# Patient Record
Sex: FEMALE | Race: WHITE | Employment: FULL TIME
[De-identification: names, ages, dates, MRNs, and addresses within clinical notes are randomized per-mention and may not be internally consistent; named-entity substitution may affect disease eponyms.]

---

## 2017-01-11 ENCOUNTER — SURGERY (OUTPATIENT)
Age: 42
End: 2017-01-11

## 2017-01-11 ENCOUNTER — ANESTHESIA EVENT (OUTPATIENT)
Dept: LABOR AND DELIVERY | Age: 42
End: 2017-01-11
Payer: COMMERCIAL

## 2017-01-11 PROBLEM — O42.90 PREMATURE RUPTURE OF MEMBRANES: Status: ACTIVE | Noted: 2017-01-11

## 2017-01-11 PROCEDURE — 77030003665 HC NDL SPN BBMI -A: Performed by: ANESTHESIOLOGY

## 2017-01-11 PROCEDURE — 74011000250 HC RX REV CODE- 250

## 2017-01-11 PROCEDURE — 74011250636 HC RX REV CODE- 250/636: Performed by: OBSTETRICS & GYNECOLOGY

## 2017-01-11 PROCEDURE — 77030007880 HC KT SPN EPDRL BBMI -B: Performed by: ANESTHESIOLOGY

## 2017-01-11 PROCEDURE — 74011250636 HC RX REV CODE- 250/636

## 2017-01-11 RX ORDER — OXYTOCIN/RINGER'S LACTATE 30/500 ML
PLASTIC BAG, INJECTION (ML) INTRAVENOUS
Status: DISCONTINUED | OUTPATIENT
Start: 2017-01-11 | End: 2017-01-11 | Stop reason: HOSPADM

## 2017-01-11 RX ORDER — DEXAMETHASONE SODIUM PHOSPHATE 4 MG/ML
INJECTION, SOLUTION INTRA-ARTICULAR; INTRALESIONAL; INTRAMUSCULAR; INTRAVENOUS; SOFT TISSUE AS NEEDED
Status: DISCONTINUED | OUTPATIENT
Start: 2017-01-11 | End: 2017-01-11 | Stop reason: HOSPADM

## 2017-01-11 RX ORDER — PROMETHAZINE HYDROCHLORIDE 25 MG/ML
INJECTION, SOLUTION INTRAMUSCULAR; INTRAVENOUS AS NEEDED
Status: DISCONTINUED | OUTPATIENT
Start: 2017-01-11 | End: 2017-01-11 | Stop reason: HOSPADM

## 2017-01-11 RX ORDER — SODIUM CHLORIDE, SODIUM LACTATE, POTASSIUM CHLORIDE, CALCIUM CHLORIDE 600; 310; 30; 20 MG/100ML; MG/100ML; MG/100ML; MG/100ML
INJECTION, SOLUTION INTRAVENOUS
Status: DISCONTINUED | OUTPATIENT
Start: 2017-01-11 | End: 2017-01-11 | Stop reason: HOSPADM

## 2017-01-11 RX ORDER — MORPHINE SULFATE 0.5 MG/ML
INJECTION, SOLUTION EPIDURAL; INTRATHECAL; INTRAVENOUS AS NEEDED
Status: DISCONTINUED | OUTPATIENT
Start: 2017-01-11 | End: 2017-01-11 | Stop reason: HOSPADM

## 2017-01-11 RX ORDER — BUPIVACAINE HYDROCHLORIDE 7.5 MG/ML
INJECTION, SOLUTION INTRASPINAL AS NEEDED
Status: DISCONTINUED | OUTPATIENT
Start: 2017-01-11 | End: 2017-01-11 | Stop reason: HOSPADM

## 2017-01-11 RX ORDER — ONDANSETRON 2 MG/ML
INJECTION INTRAMUSCULAR; INTRAVENOUS AS NEEDED
Status: DISCONTINUED | OUTPATIENT
Start: 2017-01-11 | End: 2017-01-11 | Stop reason: HOSPADM

## 2017-01-11 RX ORDER — KETOROLAC TROMETHAMINE 30 MG/ML
INJECTION, SOLUTION INTRAMUSCULAR; INTRAVENOUS AS NEEDED
Status: DISCONTINUED | OUTPATIENT
Start: 2017-01-11 | End: 2017-01-11 | Stop reason: HOSPADM

## 2017-01-11 RX ADMIN — CEFAZOLIN 2 G: 1 INJECTION, POWDER, FOR SOLUTION INTRAMUSCULAR; INTRAVENOUS; PARENTERAL at 07:48

## 2017-01-11 RX ADMIN — SODIUM CHLORIDE, SODIUM LACTATE, POTASSIUM CHLORIDE, CALCIUM CHLORIDE: 600; 310; 30; 20 INJECTION, SOLUTION INTRAVENOUS at 07:48

## 2017-01-11 RX ADMIN — PROMETHAZINE HYDROCHLORIDE 6.25 MG: 25 INJECTION, SOLUTION INTRAMUSCULAR; INTRAVENOUS at 08:45

## 2017-01-11 RX ADMIN — Medication 250 ML/HR: at 08:23

## 2017-01-11 RX ADMIN — BUPIVACAINE HYDROCHLORIDE 1.6 ML: 7.5 INJECTION, SOLUTION INTRASPINAL at 08:00

## 2017-01-11 RX ADMIN — SODIUM CHLORIDE, SODIUM LACTATE, POTASSIUM CHLORIDE, CALCIUM CHLORIDE: 600; 310; 30; 20 INJECTION, SOLUTION INTRAVENOUS at 08:21

## 2017-01-11 RX ADMIN — ONDANSETRON 4 MG: 2 INJECTION INTRAMUSCULAR; INTRAVENOUS at 08:29

## 2017-01-11 RX ADMIN — MORPHINE SULFATE 250 MCG: 0.5 INJECTION, SOLUTION EPIDURAL; INTRATHECAL; INTRAVENOUS at 08:00

## 2017-01-11 RX ADMIN — KETOROLAC TROMETHAMINE 30 MG: 30 INJECTION, SOLUTION INTRAMUSCULAR; INTRAVENOUS at 08:56

## 2017-01-11 RX ADMIN — DEXAMETHASONE SODIUM PHOSPHATE 8 MG: 4 INJECTION, SOLUTION INTRA-ARTICULAR; INTRALESIONAL; INTRAMUSCULAR; INTRAVENOUS; SOFT TISSUE at 08:29

## 2017-01-11 NOTE — ANESTHESIA PROCEDURE NOTES
Spinal Block    Performed by: Sheldon Gonzalez  Authorized by: Sheldon Gonzalez     Pre-procedure:   Indications: primary anesthetic  Preanesthetic Checklist: patient identified, risks and benefits discussed, anesthesia consent, patient being monitored and timeout performed    Timeout Time: 07:55          Spinal Block:   Patient Position:  Seated  Prep Region:  Lumbar  Prep: chlorhexidine      Location:  L3-4  Technique:  Single shot  Local:  Lidocaine 1%  Local Dose (mL):  3    Needle:   Needle Type:  Pencan  Needle Gauge:  25 G  Attempts:  1      Events: CSF confirmed, no blood with aspiration and no paresthesia        Assessment:  Insertion:  Uncomplicated  Patient tolerance:  Patient tolerated the procedure well with no immediate complications  All needles out intact, procedure tolerated well without problems

## 2017-01-11 NOTE — ANESTHESIA PREPROCEDURE EVALUATION
Anesthetic History   No history of anesthetic complications            Review of Systems / Medical History  Patient summary reviewed, nursing notes reviewed and pertinent labs reviewed    Pulmonary  Within defined limits                 Neuro/Psych   Within defined limits           Cardiovascular  Within defined limits                Exercise tolerance: >4 METS     GI/Hepatic/Renal     GERD: well controlled           Endo/Other  Within defined limits           Other Findings   Comments: Term preg, hsv2  outbreak, for emerg., cs.          Physical Exam    Airway  Mallampati: II  TM Distance: 4 - 6 cm  Neck ROM: normal range of motion   Mouth opening: Normal     Cardiovascular    Rhythm: regular           Dental  No notable dental hx       Pulmonary                 Abdominal  GI exam deferred       Other Findings            Anesthetic Plan    ASA: 2, emergent  Anesthesia type: spinal      Post-op pain plan if not by surgeon: intrathecal opiates      Anesthetic plan and risks discussed with: Patient and Spouse      duromorph for p/o pain discussed

## 2017-01-12 ENCOUNTER — ANESTHESIA (OUTPATIENT)
Dept: LABOR AND DELIVERY | Age: 42
End: 2017-01-12
Payer: COMMERCIAL

## 2017-01-12 NOTE — ANESTHESIA POSTPROCEDURE EVALUATION
Post-Anesthesia Evaluation and Assessment    Patient: Magnus Hensley MRN: [id number]  SSN: [id number]    YOB: 1975  Age: 46 y.o. Sex: female       Cardiovascular Function/Vital Signs  Visit Vitals    /74 (BP 1 Location: Right arm, BP Patient Position: At rest)    Pulse 89    Temp 36.9 °C (98.5 °F)    Resp 17    Ht 5' 7\" (1.702 m)    Wt 76.2 kg (168 lb)    SpO2 100%    Breastfeeding Yes    BMI 26.31 kg/m2       Patient is status post spinal anesthesia for Procedure(s):   SECTION. Nausea/Vomiting: None    Postoperative hydration reviewed and adequate. Pain:  Pain Scale 1: Numeric (0 - 10) (17)  Pain Intensity 1: 4 (17)   Managed    Neurological Status:   Neuro (WDL): Within Defined Limits (17 1030)   At baseline    Mental Status and Level of Consciousness: Arousable    Pulmonary Status:   O2 Device: Room air (17 1725)   Adequate oxygenation and airway patent    Complications related to anesthesia: None    Post-anesthesia assessment completed. No concerns. Good result with spinal anesthesia, resolving normally.     Signed By: Adin Almeida MD     2017

## 2021-05-17 ENCOUNTER — TRANSCRIBE ORDER (OUTPATIENT)
Dept: SCHEDULING | Age: 46
End: 2021-05-17

## 2021-05-17 DIAGNOSIS — Z12.31 SCREENING MAMMOGRAM FOR HIGH-RISK PATIENT: Primary | ICD-10-CM

## 2021-05-19 ENCOUNTER — HOSPITAL ENCOUNTER (OUTPATIENT)
Dept: MAMMOGRAPHY | Age: 46
Discharge: HOME | End: 2021-05-19
Attending: OBSTETRICS & GYNECOLOGY

## 2021-05-19 DIAGNOSIS — Z12.31 SCREENING MAMMOGRAM FOR HIGH-RISK PATIENT: ICD-10-CM

## 2021-07-12 ENCOUNTER — HOSPITAL ENCOUNTER (OUTPATIENT)
Dept: MRI IMAGING | Age: 46
Discharge: HOME | End: 2021-07-12
Attending: OBSTETRICS & GYNECOLOGY
Payer: COMMERCIAL

## 2021-07-12 DIAGNOSIS — R92.2 DENSE BREAST TISSUE ON MAMMOGRAM: ICD-10-CM

## 2021-07-12 PROCEDURE — 74011636320 HC RX REV CODE- 636/320: Performed by: OBSTETRICS & GYNECOLOGY

## 2021-07-12 PROCEDURE — A9576 INJ PROHANCE MULTIPACK: HCPCS | Performed by: OBSTETRICS & GYNECOLOGY

## 2021-07-12 PROCEDURE — 77049 MRI BREAST C-+ W/CAD BI: CPT

## 2021-07-12 PROCEDURE — 74011000258 HC RX REV CODE- 258: Performed by: OBSTETRICS & GYNECOLOGY

## 2021-07-12 RX ORDER — SODIUM CHLORIDE 0.9 % (FLUSH) 0.9 %
10 SYRINGE (ML) INJECTION
Status: COMPLETED | OUTPATIENT
Start: 2021-07-12 | End: 2021-07-12

## 2021-07-12 RX ADMIN — GADOTERIDOL 13 ML: 279.3 INJECTION, SOLUTION INTRAVENOUS at 15:06

## 2021-07-12 RX ADMIN — Medication 10 ML: at 15:07

## 2021-07-12 RX ADMIN — SODIUM CHLORIDE 100 ML: 900 INJECTION, SOLUTION INTRAVENOUS at 15:07

## 2022-03-19 PROBLEM — O42.90 PREMATURE RUPTURE OF MEMBRANES: Status: ACTIVE | Noted: 2017-01-11

## 2022-04-21 PROBLEM — N81.89 WEAKNESS OF PELVIC FLOOR: Status: ACTIVE | Noted: 2022-04-21

## 2022-06-06 ENCOUNTER — PATIENT MESSAGE (OUTPATIENT)
Dept: ENDOCRINOLOGY | Age: 47
End: 2022-06-06

## 2022-10-12 NOTE — PROGRESS NOTES
The patient is a 42 y.o. No obstetric history on file who is seen to discuss getting labs to check hormones. Her main complaint is low libido. Denies relationshal issues. Is on Fluoxetine 20mg daily, but states she has been on this since she was 19. Does feel like she has struggled with her libido for may years also. Denies h/o trauma. Denies pain/bleeding with intercourse. H/o thyroiditis, which they were initially concerned was graves disease. States she's not really sure if she's interested in medications to help with libido issues, just wants to eval hormones to see if this could be contributing. HISTORY:    No obstetric history on file. Patient's last menstrual period was 09/18/2022 (exact date). Sexual History:  has sex with males  Contraception: None  Current Outpatient Medications on File Prior to Visit   Medication Sig Dispense Refill    Cholecalciferol 50 MCG (2000 UT) TABS Take by mouth      FLUoxetine HCl, PMDD, 20 MG TABS Take 20 mg by mouth daily      Methylphenidate HCl ER 18 MG TB24 TAKE 1 TO 3 TABLETS BY MOUTH EVERY DAY      rosuvastatin (CRESTOR) 10 MG tablet Take 10 mg by mouth daily      valACYclovir (VALTREX) 500 MG tablet Take 500 mg by mouth 2 times daily       No current facility-administered medications on file prior to visit. ROS:  Feeling well. No dyspnea or chest pain on exertion. No abdominal pain, change in bowel habits, black or bloody stools. No urinary tract symptoms. GYN ROS: see above. PHYSICAL EXAM:  Blood pressure 120/62, height 5' 7\" (1.702 m), weight 143 lb 9.6 oz (65.1 kg), last menstrual period 09/18/2022. The patient appears well, alert, oriented x 3, in no distress. ASSESSMENT:  Encounter Diagnosis   Name Primary?     Decreased libido Yes       PLAN:  All questions answered  Will check labs today  Discussed could consider estratest, but she's really not sure if she desires this due to family h/o cardiac issues and personal issues with cholesterol  Could also try OTC luis root  Discussed timing intercourse  Increase emotional intimacy  Increase foreplay  Discussed counseling- works closely with psychiatrists currently  Will call pt with results and manage accordingly       Orders Placed This Encounter   Procedures    Estradiol     Standing Status:   Future     Number of Occurrences:   1     Standing Expiration Date:   10/13/2023    Testosterone, Free     Standing Status:   Future     Number of Occurrences:   1     Standing Expiration Date:   10/13/2023    Progesterone     Standing Status:   Future     Number of Occurrences:   1     Standing Expiration Date:   98/64/8957    Follicle Stimulating Hormone     Standing Status:   Future     Number of Occurrences:   1     Standing Expiration Date:   10/13/2023    Luteinizing Hormone     Standing Status:   Future     Number of Occurrences:   1     Standing Expiration Date:   10/13/2023           Supervising physician is Dr. Yamilet Ojeda. Greater than 50% of the 30 minute visit were spent in counseling to the above topics.

## 2022-10-13 ENCOUNTER — OFFICE VISIT (OUTPATIENT)
Dept: OBGYN CLINIC | Age: 47
End: 2022-10-13
Payer: COMMERCIAL

## 2022-10-13 VITALS
WEIGHT: 143.6 LBS | HEIGHT: 67 IN | SYSTOLIC BLOOD PRESSURE: 120 MMHG | BODY MASS INDEX: 22.54 KG/M2 | DIASTOLIC BLOOD PRESSURE: 62 MMHG

## 2022-10-13 DIAGNOSIS — R68.82 DECREASED LIBIDO: Primary | ICD-10-CM

## 2022-10-13 LAB
ESTRADIOL SERPL-MCNC: 46.73 PG/ML
FSH SERPL-ACNC: 14.6 MIU/ML
LH SERPL-ACNC: 11.5 MIU/ML
PROGEST SERPL-MCNC: 3.56 NG/ML

## 2022-10-13 PROCEDURE — 99214 OFFICE O/P EST MOD 30 MIN: CPT | Performed by: NURSE PRACTITIONER

## 2022-10-16 LAB — TESTOST FREE SERPL-MCNC: 0.8 PG/ML (ref 0–4.2)

## 2023-08-10 ENCOUNTER — OFFICE VISIT (OUTPATIENT)
Dept: ENDOCRINOLOGY | Age: 48
End: 2023-08-10
Payer: COMMERCIAL

## 2023-08-10 VITALS — SYSTOLIC BLOOD PRESSURE: 109 MMHG | BODY MASS INDEX: 25.53 KG/M2 | WEIGHT: 163 LBS | DIASTOLIC BLOOD PRESSURE: 77 MMHG

## 2023-08-10 DIAGNOSIS — E05.00 GRAVES' DISEASE: Primary | ICD-10-CM

## 2023-08-10 DIAGNOSIS — R00.2 PALPITATIONS: ICD-10-CM

## 2023-08-10 DIAGNOSIS — Z86.39 HISTORY OF HYPERTHYROIDISM: ICD-10-CM

## 2023-08-10 PROCEDURE — 99214 OFFICE O/P EST MOD 30 MIN: CPT | Performed by: INTERNAL MEDICINE

## 2023-08-10 ASSESSMENT — ENCOUNTER SYMPTOMS
CONSTIPATION: 0
DIARRHEA: 1

## 2023-08-10 NOTE — PROGRESS NOTES
Ashley Finn MD, [facility]            Reason for visit: Follow-up of hyperthyroidism      ASSESSMENT AND PLAN:    1. Graves' disease  She has Graves' disease but is currently biochemically euthyroid on no treatment. I will continue monitoring thyroid function over time. We discussed the potential role for definitive therapy, but that is obviously not indicated at this time. Return in 6 months with labs. - TSH; Future  - T4, Free; Future  - T3; Future    2. History of hyperthyroidism    3. Palpitations  She will continue beta-blocker therapy as needed. She will notify me if palpitations become more frequent so that I can assess thyroid function earlier.  - metoprolol tartrate (LOPRESSOR) 25 MG tablet; Take 1 tablet by mouth 2 times daily  Dispense: 180 tablet; Refill: 3      Follow-up and Dispositions    Return in about 4 months (around 12/10/2023). History of Present Illness:    THYROID DYSFUNCTION  Ishmael Prather is seen for follow-up of hyperthyroidism; this was diagnosed in  following the delivery of her first child. She was previously under care of Dr. Fransisco Perez (formerly at [facility], now in this practice) and Dr. Noelle Reyes (with [facility]). I have seen her only once, in 2021. She has canceled or no-showed multiple appointments with me. Current symptoms:  See review of systems below     Pregnancy history: A4, no plans for more pregnancy     Menses: regular     Prior treatment: Treated with methimazole from January to 2013. Stopped due to rash. Propylthiouracil started 2013. Stopped after a few weeks. Propylthiouracil 50 mg TID was started 2022. She stopped this on her own in late .      Pertinent labs:  2017: TSH 0.017, free T4 1.31, free T3 4.4, thyrotropin receptor antibodies 3.53 (+), antithyroid peroxidase antibodies 205 (+), thyroglobulin antibodies 185.4

## 2024-03-20 SDOH — ECONOMIC STABILITY: HOUSING INSECURITY
IN THE LAST 12 MONTHS, WAS THERE A TIME WHEN YOU DID NOT HAVE A STEADY PLACE TO SLEEP OR SLEPT IN A SHELTER (INCLUDING NOW)?: NO

## 2024-03-20 SDOH — ECONOMIC STABILITY: FOOD INSECURITY: WITHIN THE PAST 12 MONTHS, THE FOOD YOU BOUGHT JUST DIDN'T LAST AND YOU DIDN'T HAVE MONEY TO GET MORE.: NEVER TRUE

## 2024-03-20 SDOH — ECONOMIC STABILITY: FOOD INSECURITY: WITHIN THE PAST 12 MONTHS, YOU WORRIED THAT YOUR FOOD WOULD RUN OUT BEFORE YOU GOT MONEY TO BUY MORE.: NEVER TRUE

## 2024-03-20 SDOH — ECONOMIC STABILITY: INCOME INSECURITY: HOW HARD IS IT FOR YOU TO PAY FOR THE VERY BASICS LIKE FOOD, HOUSING, MEDICAL CARE, AND HEATING?: NOT HARD AT ALL

## 2024-03-20 SDOH — ECONOMIC STABILITY: TRANSPORTATION INSECURITY
IN THE PAST 12 MONTHS, HAS LACK OF TRANSPORTATION KEPT YOU FROM MEETINGS, WORK, OR FROM GETTING THINGS NEEDED FOR DAILY LIVING?: NO

## 2024-03-20 NOTE — PROGRESS NOTES
EVERY DAY      rosuvastatin (CRESTOR) 10 MG tablet Take 1 tablet by mouth daily      valACYclovir (VALTREX) 500 MG tablet Take 1 tablet by mouth 2 times daily       No current facility-administered medications for this visit.       Social History:  Social History     Socioeconomic History    Marital status:      Spouse name: Not on file    Number of children: Not on file    Years of education: Not on file    Highest education level: Not on file   Occupational History    Not on file   Tobacco Use    Smoking status: Former     Current packs/day: 0.00     Types: Cigarettes     Quit date: 2007     Years since quittin.2    Smokeless tobacco: Never    Tobacco comments:     Quit smoking: SMOKED FOR 11 YRS   Vaping Use    Vaping Use: Never used   Substance and Sexual Activity    Alcohol use: No    Drug use: No    Sexual activity: Yes     Partners: Male     Birth control/protection: None   Other Topics Concern    Not on file   Social History Narrative    Not on file     Social Determinants of Health     Financial Resource Strain: Not on file   Food Insecurity: Not on file (3/20/2024)   Transportation Needs: Not on file   Physical Activity: Not on file   Stress: Not on file   Social Connections: Not on file   Intimate Partner Violence: Not on file   Housing Stability: Not on file       Family History:  Family History   Problem Relation Age of Onset    Heart Disease Father     Diabetes Father     Heart Attack Father 39    Hypertension Maternal Grandmother     Thyroid Disease Mother         Hashimoto's thyroiditis    High Cholesterol Mother     Thyroid Cancer Neg Hx     No Known Problems Paternal Grandfather     No Known Problems Maternal Grandfather     Heart Disease Paternal Grandmother 39       Review of Systems - General ROS: negative except for that discussed in HPI      ROS:  Feeling well. No dyspnea or chest pain on exertion.  No abdominal pain, change in bowel habits, black or bloody stools.  No urinary

## 2024-03-21 ENCOUNTER — OFFICE VISIT (OUTPATIENT)
Dept: OBGYN CLINIC | Age: 49
End: 2024-03-21
Payer: COMMERCIAL

## 2024-03-21 VITALS
WEIGHT: 162.2 LBS | BODY MASS INDEX: 25.46 KG/M2 | DIASTOLIC BLOOD PRESSURE: 75 MMHG | SYSTOLIC BLOOD PRESSURE: 119 MMHG | HEIGHT: 67 IN

## 2024-03-21 DIAGNOSIS — Z13.89 SCREENING FOR GENITOURINARY CONDITION: ICD-10-CM

## 2024-03-21 DIAGNOSIS — Z12.4 SCREENING FOR CERVICAL CANCER: ICD-10-CM

## 2024-03-21 DIAGNOSIS — Z01.419 WELL WOMAN EXAM: Primary | ICD-10-CM

## 2024-03-21 DIAGNOSIS — Z12.39 ENCOUNTER FOR SCREENING FOR MALIGNANT NEOPLASM OF BREAST, UNSPECIFIED SCREENING MODALITY: ICD-10-CM

## 2024-03-21 DIAGNOSIS — N92.6 IRREGULAR MENSES: ICD-10-CM

## 2024-03-21 DIAGNOSIS — Z11.51 SCREENING FOR HPV (HUMAN PAPILLOMAVIRUS): ICD-10-CM

## 2024-03-21 LAB
BILIRUBIN, URINE, POC: NEGATIVE
BLOOD URINE, POC: NEGATIVE
GLUCOSE URINE, POC: NEGATIVE
KETONES, URINE, POC: NEGATIVE
LEUKOCYTE ESTERASE, URINE, POC: NEGATIVE
NITRITE, URINE, POC: NEGATIVE
PH, URINE, POC: 7 (ref 4.6–8)
PROTEIN,URINE, POC: NEGATIVE
SPECIFIC GRAVITY, URINE, POC: 1.02 (ref 1–1.03)
URINALYSIS CLARITY, POC: CLEAR
URINALYSIS COLOR, POC: YELLOW
UROBILINOGEN, POC: NORMAL

## 2024-03-21 PROCEDURE — 81002 URINALYSIS NONAUTO W/O SCOPE: CPT | Performed by: NURSE PRACTITIONER

## 2024-03-21 PROCEDURE — 99396 PREV VISIT EST AGE 40-64: CPT | Performed by: NURSE PRACTITIONER

## 2024-03-27 LAB
COLLECTION METHOD: NORMAL
CYTOLOGIST CVX/VAG CYTO: NORMAL
CYTOLOGY CVX/VAG DOC THIN PREP: NORMAL
DATE OF LMP: NORMAL
HPV APTIMA: NEGATIVE
Lab: NORMAL
Lab: NORMAL
PAP SOURCE: NORMAL
PATH REPORT.FINAL DX SPEC: NORMAL
PREV TREATMENT: NORMAL
STAT OF ADQ CVX/VAG CYTO-IMP: NORMAL

## 2024-04-05 ENCOUNTER — NURSE ONLY (OUTPATIENT)
Dept: OBGYN CLINIC | Age: 49
End: 2024-04-05
Payer: COMMERCIAL

## 2024-04-05 DIAGNOSIS — R30.0 BURNING WITH URINATION: ICD-10-CM

## 2024-04-05 DIAGNOSIS — N92.6 IRREGULAR MENSES: ICD-10-CM

## 2024-04-05 DIAGNOSIS — R39.9 UTI SYMPTOMS: Primary | ICD-10-CM

## 2024-04-05 LAB
BILIRUBIN, URINE, POC: NEGATIVE
BLOOD URINE, POC: NEGATIVE
GLUCOSE URINE, POC: NEGATIVE
KETONES, URINE, POC: NEGATIVE
LEUKOCYTE ESTERASE, URINE, POC: NORMAL
NITRITE, URINE, POC: NEGATIVE
PH, URINE, POC: 7 (ref 4.6–8)
PROTEIN,URINE, POC: NEGATIVE
SPECIFIC GRAVITY, URINE, POC: 1.01 (ref 1–1.03)
T4 FREE SERPL-MCNC: 0.9 NG/DL (ref 0.78–1.46)
TSH, 3RD GENERATION: 1.34 UIU/ML (ref 0.36–3.74)
URINALYSIS CLARITY, POC: CLEAR
URINALYSIS COLOR, POC: YELLOW
UROBILINOGEN, POC: NORMAL

## 2024-04-05 PROCEDURE — 81003 URINALYSIS AUTO W/O SCOPE: CPT | Performed by: NURSE PRACTITIONER

## 2024-04-05 NOTE — PROGRESS NOTES
Gyn patient presents to the office for labs to be drawn.  She also complains of burning with urination and would like to send her urine off for a urine culture.  UA done today and culture was sent per SUSANNA Chow    Orders Placed This Encounter   Procedures    Culture, Urine     Standing Status:   Future     Number of Occurrences:   1     Standing Expiration Date:   4/5/2025    AMB POC URINALYSIS DIP STICK AUTO W/O MICRO

## 2024-04-07 LAB
BACTERIA SPEC CULT: ABNORMAL
BACTERIA SPEC CULT: ABNORMAL
SERVICE CMNT-IMP: ABNORMAL

## 2024-04-23 ENCOUNTER — NURSE ONLY (OUTPATIENT)
Dept: OBGYN CLINIC | Age: 49
End: 2024-04-23

## 2024-04-23 DIAGNOSIS — N92.6 IRREGULAR MENSES: ICD-10-CM

## 2024-04-23 LAB
25(OH)D3 SERPL-MCNC: 36.2 NG/ML (ref 30–100)
ESTRADIOL SERPL-MCNC: 30.3 PG/ML
FSH SERPL-ACNC: 12.5 MIU/ML
LH SERPL-ACNC: 7.8 MIU/ML
PROGEST SERPL-MCNC: 0.76 NG/ML

## 2024-04-28 LAB — TESTOST FREE SERPL-MCNC: 1 PG/ML (ref 0–4.2)

## 2024-09-03 NOTE — PROGRESS NOTES
42.5 g, Rfl: 4    Cholecalciferol 50 MCG (2000 UT) TABS, Take by mouth, Disp: , Rfl:     FLUoxetine HCl, PMDD, 20 MG TABS, Take 20 mg by mouth daily, Disp: , Rfl:     Methylphenidate HCl ER 18 MG TB24, TAKE 1 TO 3 TABLETS BY MOUTH EVERY DAY, Disp: , Rfl:     rosuvastatin (CRESTOR) 10 MG tablet, Take 1 tablet by mouth daily, Disp: , Rfl:     valACYclovir (VALTREX) 500 MG tablet, Take 1 tablet by mouth 2 times daily, Disp: , Rfl:     metoprolol tartrate (LOPRESSOR) 25 MG tablet, Take 1 tablet by mouth 2 times daily, Disp: 180 tablet, Rfl: 3     Review of Systems  Neg except hpi   PHYSICAL EXAM:  /76   Ht 1.702 m (5' 7.01\")   Wt 72.6 kg (160 lb 1.6 oz)   LMP 08/28/2024 (Approximate)   BMI 25.07 kg/m²   Physical Exam  General: well nourished well developed female in nad  Heart rrr  Lungs cta b&s  Abdomen soft nontender.  No enlargement of liver.    Extremities: no calf pain  Pelvic exam: normal external genitalia, vulva, vagina, cervix, uterus and adnexa.   Mod white discharge present. Nuswab collected.  Next speculum broken down and mild to mod cystocele noted.  Very mild rectocele.  On bimanual exam - with kegel -strength is about +2 to +3.      ASSESSMENT / PLAN:  Willow was seen today for gynecologic exam.    Diagnoses and all orders for this visit:    UTI symptoms  -     AMB POC URINALYSIS DIP STICK MANUAL W/O MICRO    Vaginal irritation  -     Nuswab Vaginitis Plus (VG+); Future  -     Nuswab Vaginitis Plus (VG+)    Vaginal itching  -     Nuswab Vaginitis Plus (VG+); Future  -     Nuswab Vaginitis Plus (VG+)    Other orders  -     estradiol (ESTRACE VAGINAL) 0.1 MG/GM vaginal cream; Apply 0.5-1 g vaginally twice weekly    Discussed use of vaginal estrogen for dryness.  When she sees urogyn - may recommend pelvic floor PT.  Nuswab sent.        Kiah Stanley, DO

## 2024-09-04 ENCOUNTER — OFFICE VISIT (OUTPATIENT)
Dept: OBGYN CLINIC | Age: 49
End: 2024-09-04
Payer: COMMERCIAL

## 2024-09-04 VITALS
WEIGHT: 160.1 LBS | HEIGHT: 67 IN | DIASTOLIC BLOOD PRESSURE: 76 MMHG | SYSTOLIC BLOOD PRESSURE: 124 MMHG | BODY MASS INDEX: 25.13 KG/M2

## 2024-09-04 DIAGNOSIS — N89.8 VAGINAL ITCHING: ICD-10-CM

## 2024-09-04 DIAGNOSIS — R39.9 UTI SYMPTOMS: Primary | ICD-10-CM

## 2024-09-04 DIAGNOSIS — N89.8 VAGINAL IRRITATION: ICD-10-CM

## 2024-09-04 LAB
BILIRUBIN, URINE, POC: NEGATIVE
BLOOD URINE, POC: NEGATIVE
GLUCOSE URINE, POC: NEGATIVE
KETONES, URINE, POC: NEGATIVE
LEUKOCYTE ESTERASE, URINE, POC: NEGATIVE
NITRITE, URINE, POC: NEGATIVE
PH, URINE, POC: 7 (ref 4.6–8)
PROTEIN,URINE, POC: NEGATIVE
SPECIFIC GRAVITY, URINE, POC: 1.01 (ref 1–1.03)
URINALYSIS CLARITY, POC: CLEAR
URINALYSIS COLOR, POC: YELLOW
UROBILINOGEN, POC: NORMAL

## 2024-09-04 PROCEDURE — 99214 OFFICE O/P EST MOD 30 MIN: CPT | Performed by: OBSTETRICS & GYNECOLOGY

## 2024-09-04 PROCEDURE — 81002 URINALYSIS NONAUTO W/O SCOPE: CPT | Performed by: OBSTETRICS & GYNECOLOGY

## 2024-09-04 RX ORDER — ESTRADIOL 0.1 MG/G
CREAM VAGINAL
Qty: 42.5 G | Refills: 4 | Status: SHIPPED | OUTPATIENT
Start: 2024-09-04

## 2024-09-07 LAB
A VAGINAE DNA VAG QL NAA+PROBE: NORMAL SCORE
BVAB2 DNA VAG QL NAA+PROBE: NORMAL SCORE
C ALBICANS DNA VAG QL NAA+PROBE: NEGATIVE
C GLABRATA DNA VAG QL NAA+PROBE: NEGATIVE
C TRACH RRNA SPEC QL NAA+PROBE: NEGATIVE
MEGA1 DNA VAG QL NAA+PROBE: NORMAL SCORE
N GONORRHOEA RRNA SPEC QL NAA+PROBE: NEGATIVE
SPECIMEN SOURCE: NORMAL
T VAGINALIS RRNA SPEC QL NAA+PROBE: NEGATIVE

## 2025-03-26 ENCOUNTER — OFFICE VISIT (OUTPATIENT)
Age: 50
End: 2025-03-26
Payer: COMMERCIAL

## 2025-03-26 VITALS — WEIGHT: 163.6 LBS | HEIGHT: 67 IN | BODY MASS INDEX: 25.68 KG/M2

## 2025-03-26 DIAGNOSIS — M54.16 LUMBAR RADICULOPATHY: ICD-10-CM

## 2025-03-26 DIAGNOSIS — M47.816 LUMBAR FACET ARTHROPATHY: ICD-10-CM

## 2025-03-26 DIAGNOSIS — M43.16 SPONDYLOLISTHESIS OF LUMBAR REGION: ICD-10-CM

## 2025-03-26 DIAGNOSIS — M54.50 LOW BACK PAIN, UNSPECIFIED BACK PAIN LATERALITY, UNSPECIFIED CHRONICITY, UNSPECIFIED WHETHER SCIATICA PRESENT: Primary | ICD-10-CM

## 2025-03-26 DIAGNOSIS — M48.061 STENOSIS OF LATERAL RECESS OF LUMBAR SPINE: ICD-10-CM

## 2025-03-26 PROCEDURE — 99204 OFFICE O/P NEW MOD 45 MIN: CPT | Performed by: NURSE PRACTITIONER

## 2025-03-26 NOTE — PROGRESS NOTES
Name: Willow Salinas  YOB: 1975  Gender: female  MRN: [id number]    CC: Low back pain, bilateral leg pain left worse than right    HPI: This is a 49 y.o. year old female who approximate month ago was doing weights and deadlifts and developed significant left buttock and leg pain.  Then she did a lot of walking and unfortunately continued to have significant pain in the lower back left buttock wrapping down the lateral leg into the shin and ankle.  Also noticing that she had increasing complaints of low back pain and bilateral groin pain.  Pain is worse in the morning and at night.  Gets better with movement.  Few times her left leg has given out.  Pain can be sharp and random.  She has tried Flexeril, Advil      The patient denies any change in bowel or bladder function since the onset of the symptoms. she  has not had lumbar surgery in the past.      Thus far, the patient has tried NSAIDS, muscle relaxers, and physician directed home exercise program.  Patient has been doing a home exercise regimen via her primary care provider over the past 5 weeks.    Current pain level: 7/10  Activities limited by pain: Driving, standing for long periods of time           3/26/2025     1:09 PM   AMB PAIN ASSESSMENT   Location of Pain Back   Location Modifiers Right;Left   Severity of Pain 2   Frequency of Pain Intermittent            ROS/Meds/PSH/PMH/FH/SH: I personally reviewed the patient's collected intake data.  Below are the pertinents:    Allergies   Allergen Reactions    Codeine Other (See Comments)     Agitation    Other reaction(s): Other (comments)   Agitation   Agitation    Melatonin Other (See Comments)    Methimazole Hives    Bupropion Nausea And Vomiting    Erythromycin Rash         Current Outpatient Medications:     estradiol (ESTRACE VAGINAL) 0.1 MG/GM vaginal cream, Apply 0.5-1 g vaginally twice weekly, Disp: 42.5 g, Rfl: 4    Cholecalciferol 50 MCG (2000 UT) TABS, Take by mouth, Disp: ,

## 2025-03-27 RX ORDER — DICLOFENAC POTASSIUM 50 MG/1
50 TABLET, FILM COATED ORAL 3 TIMES DAILY PRN
Qty: 90 TABLET | Refills: 0 | Status: SHIPPED | OUTPATIENT
Start: 2025-03-27

## 2025-03-27 RX ORDER — GABAPENTIN 100 MG/1
100-300 CAPSULE ORAL NIGHTLY
Qty: 90 CAPSULE | Refills: 0 | Status: SHIPPED | OUTPATIENT
Start: 2025-03-27 | End: 2025-04-26

## 2025-03-27 RX ORDER — METHOCARBAMOL 500 MG/1
500 TABLET, FILM COATED ORAL 4 TIMES DAILY PRN
Qty: 90 TABLET | Refills: 0 | Status: SHIPPED | OUTPATIENT
Start: 2025-03-27 | End: 2025-04-26